# Patient Record
Sex: MALE | Race: WHITE | Employment: FULL TIME | URBAN - METROPOLITAN AREA
[De-identification: names, ages, dates, MRNs, and addresses within clinical notes are randomized per-mention and may not be internally consistent; named-entity substitution may affect disease eponyms.]

---

## 2018-05-16 ENCOUNTER — LAB ENCOUNTER (OUTPATIENT)
Dept: LAB | Facility: HOSPITAL | Age: 28
End: 2018-05-16
Attending: Other
Payer: COMMERCIAL

## 2018-05-16 DIAGNOSIS — G35 MULTIPLE SCLEROSIS (HCC): Primary | ICD-10-CM

## 2018-05-16 PROCEDURE — 82306 VITAMIN D 25 HYDROXY: CPT

## 2018-05-16 PROCEDURE — 83883 ASSAY NEPHELOMETRY NOT SPEC: CPT

## 2018-05-16 PROCEDURE — 86617 LYME DISEASE ANTIBODY: CPT

## 2018-05-16 PROCEDURE — 86256 FLUORESCENT ANTIBODY TITER: CPT

## 2018-05-16 PROCEDURE — 84155 ASSAY OF PROTEIN SERUM: CPT

## 2018-05-16 PROCEDURE — 82784 ASSAY IGA/IGD/IGG/IGM EACH: CPT

## 2018-05-16 PROCEDURE — 84165 PROTEIN E-PHORESIS SERUM: CPT

## 2018-05-16 PROCEDURE — 86038 ANTINUCLEAR ANTIBODIES: CPT

## 2018-05-16 PROCEDURE — 36415 COLL VENOUS BLD VENIPUNCTURE: CPT

## 2018-05-16 PROCEDURE — 86431 RHEUMATOID FACTOR QUANT: CPT

## 2018-05-16 PROCEDURE — 83516 IMMUNOASSAY NONANTIBODY: CPT

## 2018-05-16 PROCEDURE — 82525 ASSAY OF COPPER: CPT

## 2018-05-16 PROCEDURE — 86790 VIRUS ANTIBODY NOS: CPT

## 2018-05-16 PROCEDURE — 86334 IMMUNOFIX E-PHORESIS SERUM: CPT

## 2018-05-16 PROCEDURE — 86255 FLUORESCENT ANTIBODY SCREEN: CPT

## 2018-05-16 PROCEDURE — 82728 ASSAY OF FERRITIN: CPT

## 2018-05-16 PROCEDURE — 83921 ORGANIC ACID SINGLE QUANT: CPT

## 2018-05-17 NOTE — IMAGING NOTE
SPOKE WITH PATIENT AND REVIEWED MEDICATIONS AND PROCEDURAL PREP. WILL HAVE , TAKING XANAX PRIOR.  ARRIVE FOR [address] [place] [address]

## 2018-05-18 ENCOUNTER — APPOINTMENT (OUTPATIENT)
Dept: LAB | Facility: HOSPITAL | Age: 28
End: 2018-05-18
Attending: Other
Payer: COMMERCIAL

## 2018-05-18 ENCOUNTER — HOSPITAL ENCOUNTER (OUTPATIENT)
Dept: GENERAL RADIOLOGY | Facility: HOSPITAL | Age: 28
Discharge: HOME | End: 2018-05-18
Attending: Other
Payer: COMMERCIAL

## 2018-05-18 VITALS
OXYGEN SATURATION: 100 % | DIASTOLIC BLOOD PRESSURE: 74 MMHG | HEART RATE: 76 BPM | SYSTOLIC BLOOD PRESSURE: 126 MMHG | RESPIRATION RATE: 16 BRPM

## 2018-05-18 DIAGNOSIS — G35 MULTIPLE SCLEROSIS (HCC): ICD-10-CM

## 2018-05-18 PROCEDURE — 82042 OTHER SOURCE ALBUMIN QUAN EA: CPT

## 2018-05-18 PROCEDURE — 62270 DX LMBR SPI PNXR: CPT | Performed by: OTHER

## 2018-05-18 PROCEDURE — 84157 ASSAY OF PROTEIN OTHER: CPT

## 2018-05-18 PROCEDURE — 89050 BODY FLUID CELL COUNT: CPT

## 2018-05-18 PROCEDURE — 36415 COLL VENOUS BLD VENIPUNCTURE: CPT

## 2018-05-18 PROCEDURE — 82784 ASSAY IGA/IGD/IGG/IGM EACH: CPT

## 2018-05-18 PROCEDURE — 86592 SYPHILIS TEST NON-TREP QUAL: CPT

## 2018-05-18 PROCEDURE — 88160 CYTOPATH SMEAR OTHER SOURCE: CPT

## 2018-05-18 PROCEDURE — 82945 GLUCOSE OTHER FLUID: CPT

## 2018-05-18 PROCEDURE — 82040 ASSAY OF SERUM ALBUMIN: CPT

## 2018-05-18 PROCEDURE — 77003 FLUOROGUIDE FOR SPINE INJECT: CPT | Performed by: OTHER

## 2018-05-18 PROCEDURE — 89051 BODY FLUID CELL COUNT: CPT

## 2018-05-18 PROCEDURE — 83916 OLIGOCLONAL BANDS: CPT

## 2018-05-18 RX ORDER — LIDOCAINE HYDROCHLORIDE 10 MG/ML
INJECTION, SOLUTION EPIDURAL; INFILTRATION; INTRACAUDAL; PERINEURAL
Status: COMPLETED
Start: 2018-05-18 | End: 2018-05-18

## 2018-05-18 RX ORDER — LIDOCAINE HYDROCHLORIDE 10 MG/ML
10 INJECTION, SOLUTION EPIDURAL; INFILTRATION; INTRACAUDAL; PERINEURAL ONCE
Status: COMPLETED | OUTPATIENT
Start: 2018-05-18 | End: 2018-05-18

## 2018-05-18 RX ADMIN — LIDOCAINE HYDROCHLORIDE 7 ML: 10 INJECTION, SOLUTION EPIDURAL; INFILTRATION; INTRACAUDAL; PERINEURAL at 13:00:00

## 2018-05-18 NOTE — IMAGING NOTE
PT ASSESSED IN DI PRE LUMBAR PUNCTURE. REVIEWED MEDS AND ALLERGIES. VITAL SIGNS STABLE (SEE FLOW SHEET). PT STATES HE IS HAVING LUMBAR PUNCTURE DUE TO BILATERAL LOWER EXTREMITY WEAKNESS AND OCCASIONAL TINGLING SENSATION IN LOWER EXTREMITIES.  PT DENIES ANY

## 2019-08-16 ENCOUNTER — LAB ENCOUNTER (OUTPATIENT)
Dept: LAB | Age: 29
End: 2019-08-16
Attending: INTERNAL MEDICINE
Payer: COMMERCIAL

## 2019-08-16 DIAGNOSIS — Z00.00 ROUTINE GENERAL MEDICAL EXAMINATION AT A HEALTH CARE FACILITY: Primary | ICD-10-CM

## 2019-08-16 LAB
ALBUMIN SERPL-MCNC: 4.2 G/DL (ref 3.4–5)
ALBUMIN/GLOB SERPL: 1.2 {RATIO} (ref 1–2)
ALP LIVER SERPL-CCNC: 73 U/L (ref 45–117)
ALT SERPL-CCNC: 31 U/L (ref 16–61)
ANION GAP SERPL CALC-SCNC: 7 MMOL/L (ref 0–18)
AST SERPL-CCNC: 19 U/L (ref 15–37)
BASOPHILS # BLD AUTO: 0.06 X10(3) UL (ref 0–0.2)
BASOPHILS NFR BLD AUTO: 1.2 %
BILIRUB SERPL-MCNC: 0.7 MG/DL (ref 0.1–2)
BUN BLD-MCNC: 14 MG/DL (ref 7–18)
BUN/CREAT SERPL: 14.4 (ref 10–20)
CALCIUM BLD-MCNC: 8.9 MG/DL (ref 8.5–10.1)
CHLORIDE SERPL-SCNC: 106 MMOL/L (ref 98–112)
CHOLEST SMN-MCNC: 166 MG/DL (ref ?–200)
CO2 SERPL-SCNC: 27 MMOL/L (ref 21–32)
CREAT BLD-MCNC: 0.97 MG/DL (ref 0.7–1.3)
DEPRECATED RDW RBC AUTO: 39.8 FL (ref 35.1–46.3)
EOSINOPHIL # BLD AUTO: 0.23 X10(3) UL (ref 0–0.7)
EOSINOPHIL NFR BLD AUTO: 4.5 %
ERYTHROCYTE [DISTWIDTH] IN BLOOD BY AUTOMATED COUNT: 12.3 % (ref 11–15)
GLOBULIN PLAS-MCNC: 3.6 G/DL (ref 2.8–4.4)
GLUCOSE BLD-MCNC: 93 MG/DL (ref 70–99)
HCT VFR BLD AUTO: 41.2 % (ref 39–53)
HDLC SERPL-MCNC: 50 MG/DL (ref 40–59)
HGB BLD-MCNC: 14.1 G/DL (ref 13–17.5)
IMM GRANULOCYTES # BLD AUTO: 0 X10(3) UL (ref 0–1)
IMM GRANULOCYTES NFR BLD: 0 %
LDLC SERPL CALC-MCNC: 107 MG/DL (ref ?–100)
LYMPHOCYTES # BLD AUTO: 2.56 X10(3) UL (ref 1–4)
LYMPHOCYTES NFR BLD AUTO: 50.5 %
M PROTEIN MFR SERPL ELPH: 7.8 G/DL (ref 6.4–8.2)
MCH RBC QN AUTO: 30.3 PG (ref 26–34)
MCHC RBC AUTO-ENTMCNC: 34.2 G/DL (ref 31–37)
MCV RBC AUTO: 88.6 FL (ref 80–100)
MONOCYTES # BLD AUTO: 0.39 X10(3) UL (ref 0.1–1)
MONOCYTES NFR BLD AUTO: 7.7 %
NEUTROPHILS # BLD AUTO: 1.83 X10 (3) UL (ref 1.5–7.7)
NEUTROPHILS # BLD AUTO: 1.83 X10(3) UL (ref 1.5–7.7)
NEUTROPHILS NFR BLD AUTO: 36.1 %
NONHDLC SERPL-MCNC: 116 MG/DL (ref ?–130)
OSMOLALITY SERPL CALC.SUM OF ELEC: 290 MOSM/KG (ref 275–295)
PATIENT FASTING: YES
PATIENT FASTING: YES
PLATELET # BLD AUTO: 321 10(3)UL (ref 150–450)
POTASSIUM SERPL-SCNC: 3.9 MMOL/L (ref 3.5–5.1)
RBC # BLD AUTO: 4.65 X10(6)UL (ref 4.3–5.7)
SODIUM SERPL-SCNC: 140 MMOL/L (ref 136–145)
TRIGL SERPL-MCNC: 45 MG/DL (ref 30–149)
VLDLC SERPL CALC-MCNC: 9 MG/DL (ref 0–30)
WBC # BLD AUTO: 5.1 X10(3) UL (ref 4–11)

## 2019-08-16 PROCEDURE — 80061 LIPID PANEL: CPT

## 2019-08-16 PROCEDURE — 36415 COLL VENOUS BLD VENIPUNCTURE: CPT

## 2019-08-16 PROCEDURE — 80053 COMPREHEN METABOLIC PANEL: CPT

## 2019-08-16 PROCEDURE — 85025 COMPLETE CBC W/AUTO DIFF WBC: CPT

## 2024-02-08 ENCOUNTER — APPOINTMENT (OUTPATIENT)
Dept: CT IMAGING | Facility: HOSPITAL | Age: 34
End: 2024-02-08
Attending: EMERGENCY MEDICINE
Payer: COMMERCIAL

## 2024-02-08 ENCOUNTER — HOSPITAL ENCOUNTER (OUTPATIENT)
Facility: HOSPITAL | Age: 34
Setting detail: OBSERVATION
Discharge: HOME | End: 2024-02-10
Attending: EMERGENCY MEDICINE | Admitting: STUDENT IN AN ORGANIZED HEALTH CARE EDUCATION/TRAINING PROGRAM
Payer: COMMERCIAL

## 2024-02-08 DIAGNOSIS — K35.80 ACUTE APPENDICITIS, UNSPECIFIED ACUTE APPENDICITIS TYPE: Primary | ICD-10-CM

## 2024-02-08 DIAGNOSIS — K37 APPENDICITIS: ICD-10-CM

## 2024-02-08 DIAGNOSIS — K37 APPENDICITIS: Primary | ICD-10-CM

## 2024-02-08 LAB
ALBUMIN SERPL-MCNC: 4.3 G/DL (ref 3.4–5)
ALBUMIN/GLOB SERPL: 1.2 {RATIO} (ref 1–2)
ALP LIVER SERPL-CCNC: 72 U/L
ALT SERPL-CCNC: 31 U/L
ANION GAP SERPL CALC-SCNC: 5 MMOL/L (ref 0–18)
AST SERPL-CCNC: 23 U/L (ref 15–37)
BASOPHILS # BLD AUTO: 0.05 X10(3) UL (ref 0–0.2)
BASOPHILS NFR BLD AUTO: 0.4 %
BILIRUB SERPL-MCNC: 1 MG/DL (ref 0.1–2)
BILIRUB UR QL STRIP.AUTO: NEGATIVE
BUN BLD-MCNC: 14 MG/DL (ref 9–23)
CALCIUM BLD-MCNC: 9.4 MG/DL (ref 8.5–10.1)
CHLORIDE SERPL-SCNC: 107 MMOL/L (ref 98–112)
CLARITY UR REFRACT.AUTO: CLEAR
CO2 SERPL-SCNC: 25 MMOL/L (ref 21–32)
CREAT BLD-MCNC: 0.94 MG/DL
EGFRCR SERPLBLD CKD-EPI 2021: 108 ML/MIN/1.73M2 (ref 60–?)
EOSINOPHIL # BLD AUTO: 0.15 X10(3) UL (ref 0–0.7)
EOSINOPHIL NFR BLD AUTO: 1.2 %
ERYTHROCYTE [DISTWIDTH] IN BLOOD BY AUTOMATED COUNT: 12.2 %
GLOBULIN PLAS-MCNC: 3.7 G/DL (ref 2.8–4.4)
GLUCOSE BLD-MCNC: 107 MG/DL (ref 70–99)
GLUCOSE UR STRIP.AUTO-MCNC: NORMAL MG/DL
HCT VFR BLD AUTO: 41.3 %
HGB BLD-MCNC: 14.7 G/DL
IMM GRANULOCYTES # BLD AUTO: 0.05 X10(3) UL (ref 0–1)
IMM GRANULOCYTES NFR BLD: 0.4 %
KETONES UR STRIP.AUTO-MCNC: 10 MG/DL
LEUKOCYTE ESTERASE UR QL STRIP.AUTO: NEGATIVE
LIPASE SERPL-CCNC: 38 U/L (ref 13–75)
LYMPHOCYTES # BLD AUTO: 1.37 X10(3) UL (ref 1–4)
LYMPHOCYTES NFR BLD AUTO: 10.7 %
MCH RBC QN AUTO: 30.6 PG (ref 26–34)
MCHC RBC AUTO-ENTMCNC: 35.6 G/DL (ref 31–37)
MCV RBC AUTO: 86 FL
MONOCYTES # BLD AUTO: 0.73 X10(3) UL (ref 0.1–1)
MONOCYTES NFR BLD AUTO: 5.7 %
NEUTROPHILS # BLD AUTO: 10.4 X10 (3) UL (ref 1.5–7.7)
NEUTROPHILS # BLD AUTO: 10.4 X10(3) UL (ref 1.5–7.7)
NEUTROPHILS NFR BLD AUTO: 81.6 %
NITRITE UR QL STRIP.AUTO: NEGATIVE
OSMOLALITY SERPL CALC.SUM OF ELEC: 285 MOSM/KG (ref 275–295)
PH UR STRIP.AUTO: 6 [PH] (ref 5–8)
PLATELET # BLD AUTO: 318 10(3)UL (ref 150–450)
POTASSIUM SERPL-SCNC: 3.9 MMOL/L (ref 3.5–5.1)
PROT SERPL-MCNC: 8 G/DL (ref 6.4–8.2)
PROT UR STRIP.AUTO-MCNC: NEGATIVE MG/DL
RBC # BLD AUTO: 4.8 X10(6)UL
RBC UR QL AUTO: NEGATIVE
SODIUM SERPL-SCNC: 137 MMOL/L (ref 136–145)
SP GR UR STRIP.AUTO: 1.01 (ref 1–1.03)
UROBILINOGEN UR STRIP.AUTO-MCNC: NORMAL MG/DL
WBC # BLD AUTO: 12.8 X10(3) UL (ref 4–11)

## 2024-02-08 PROCEDURE — 96361 HYDRATE IV INFUSION ADD-ON: CPT

## 2024-02-08 PROCEDURE — 81003 URINALYSIS AUTO W/O SCOPE: CPT | Performed by: EMERGENCY MEDICINE

## 2024-02-08 PROCEDURE — 74177 CT ABD & PELVIS W/CONTRAST: CPT | Performed by: EMERGENCY MEDICINE

## 2024-02-08 PROCEDURE — 83690 ASSAY OF LIPASE: CPT | Performed by: EMERGENCY MEDICINE

## 2024-02-08 PROCEDURE — 99285 EMERGENCY DEPT VISIT HI MDM: CPT

## 2024-02-08 PROCEDURE — 96365 THER/PROPH/DIAG IV INF INIT: CPT

## 2024-02-08 PROCEDURE — 80053 COMPREHEN METABOLIC PANEL: CPT

## 2024-02-08 PROCEDURE — 80053 COMPREHEN METABOLIC PANEL: CPT | Performed by: EMERGENCY MEDICINE

## 2024-02-08 PROCEDURE — 85025 COMPLETE CBC W/AUTO DIFF WBC: CPT | Performed by: EMERGENCY MEDICINE

## 2024-02-08 PROCEDURE — 83690 ASSAY OF LIPASE: CPT

## 2024-02-08 PROCEDURE — 85025 COMPLETE CBC W/AUTO DIFF WBC: CPT

## 2024-02-08 PROCEDURE — 96376 TX/PRO/DX INJ SAME DRUG ADON: CPT

## 2024-02-08 PROCEDURE — 96375 TX/PRO/DX INJ NEW DRUG ADDON: CPT

## 2024-02-08 RX ORDER — KETOROLAC TROMETHAMINE 15 MG/ML
15 INJECTION, SOLUTION INTRAMUSCULAR; INTRAVENOUS ONCE
Status: COMPLETED | OUTPATIENT
Start: 2024-02-08 | End: 2024-02-08

## 2024-02-08 RX ORDER — ONDANSETRON 2 MG/ML
4 INJECTION INTRAMUSCULAR; INTRAVENOUS ONCE
Status: COMPLETED | OUTPATIENT
Start: 2024-02-08 | End: 2024-02-08

## 2024-02-08 NOTE — ED INITIAL ASSESSMENT (HPI)
Lower abdominal pain since morning with vomiting ,  no diarrhea , no fever , no blood in the vomitus

## 2024-02-09 ENCOUNTER — ANESTHESIA EVENT (OUTPATIENT)
Dept: SURGERY | Facility: HOSPITAL | Age: 34
End: 2024-02-09
Payer: COMMERCIAL

## 2024-02-09 ENCOUNTER — ANESTHESIA (OUTPATIENT)
Dept: SURGERY | Facility: HOSPITAL | Age: 34
End: 2024-02-09
Payer: COMMERCIAL

## 2024-02-09 VITALS
HEIGHT: 69 IN | SYSTOLIC BLOOD PRESSURE: 137 MMHG | OXYGEN SATURATION: 98 % | DIASTOLIC BLOOD PRESSURE: 86 MMHG | WEIGHT: 201.63 LBS | RESPIRATION RATE: 16 BRPM | HEART RATE: 58 BPM | BODY MASS INDEX: 29.86 KG/M2 | TEMPERATURE: 98 F

## 2024-02-09 PROCEDURE — 88312 SPECIAL STAINS GROUP 1: CPT | Performed by: STUDENT IN AN ORGANIZED HEALTH CARE EDUCATION/TRAINING PROGRAM

## 2024-02-09 PROCEDURE — 88304 TISSUE EXAM BY PATHOLOGIST: CPT | Performed by: STUDENT IN AN ORGANIZED HEALTH CARE EDUCATION/TRAINING PROGRAM

## 2024-02-09 PROCEDURE — 0DTJ4ZZ RESECTION OF APPENDIX, PERCUTANEOUS ENDOSCOPIC APPROACH: ICD-10-PCS | Performed by: STUDENT IN AN ORGANIZED HEALTH CARE EDUCATION/TRAINING PROGRAM

## 2024-02-09 RX ORDER — MIDAZOLAM HYDROCHLORIDE 1 MG/ML
INJECTION INTRAMUSCULAR; INTRAVENOUS AS NEEDED
Status: DISCONTINUED | OUTPATIENT
Start: 2024-02-09 | End: 2024-02-09 | Stop reason: SURG

## 2024-02-09 RX ORDER — SODIUM CHLORIDE 9 MG/ML
INJECTION, SOLUTION INTRAVENOUS CONTINUOUS
Status: DISCONTINUED | OUTPATIENT
Start: 2024-02-09 | End: 2024-02-10

## 2024-02-09 RX ORDER — NALOXONE HYDROCHLORIDE 0.4 MG/ML
0.08 INJECTION, SOLUTION INTRAMUSCULAR; INTRAVENOUS; SUBCUTANEOUS AS NEEDED
Status: DISCONTINUED | OUTPATIENT
Start: 2024-02-09 | End: 2024-02-09 | Stop reason: HOSPADM

## 2024-02-09 RX ORDER — HYDROCODONE BITARTRATE AND ACETAMINOPHEN 5; 325 MG/1; MG/1
2 TABLET ORAL ONCE AS NEEDED
Status: DISCONTINUED | OUTPATIENT
Start: 2024-02-09 | End: 2024-02-09 | Stop reason: HOSPADM

## 2024-02-09 RX ORDER — HYDROMORPHONE HYDROCHLORIDE 1 MG/ML
0.8 INJECTION, SOLUTION INTRAMUSCULAR; INTRAVENOUS; SUBCUTANEOUS EVERY 2 HOUR PRN
Status: DISCONTINUED | OUTPATIENT
Start: 2024-02-09 | End: 2024-02-10

## 2024-02-09 RX ORDER — ACETAMINOPHEN 500 MG
1000 TABLET ORAL ONCE AS NEEDED
Status: DISCONTINUED | OUTPATIENT
Start: 2024-02-09 | End: 2024-02-09 | Stop reason: HOSPADM

## 2024-02-09 RX ORDER — BUPIVACAINE HYDROCHLORIDE 2.5 MG/ML
INJECTION, SOLUTION EPIDURAL; INFILTRATION; INTRACAUDAL AS NEEDED
Status: DISCONTINUED | OUTPATIENT
Start: 2024-02-09 | End: 2024-02-09 | Stop reason: HOSPADM

## 2024-02-09 RX ORDER — SODIUM CHLORIDE, SODIUM LACTATE, POTASSIUM CHLORIDE, CALCIUM CHLORIDE 600; 310; 30; 20 MG/100ML; MG/100ML; MG/100ML; MG/100ML
INJECTION, SOLUTION INTRAVENOUS CONTINUOUS
Status: DISCONTINUED | OUTPATIENT
Start: 2024-02-09 | End: 2024-02-10

## 2024-02-09 RX ORDER — CEFAZOLIN SODIUM 1 G/3ML
INJECTION, POWDER, FOR SOLUTION INTRAMUSCULAR; INTRAVENOUS AS NEEDED
Status: DISCONTINUED | OUTPATIENT
Start: 2024-02-09 | End: 2024-02-09 | Stop reason: SURG

## 2024-02-09 RX ORDER — GLYCOPYRROLATE 0.2 MG/ML
INJECTION, SOLUTION INTRAMUSCULAR; INTRAVENOUS AS NEEDED
Status: DISCONTINUED | OUTPATIENT
Start: 2024-02-09 | End: 2024-02-09 | Stop reason: SURG

## 2024-02-09 RX ORDER — ACETAMINOPHEN 325 MG/1
650 TABLET ORAL
Status: DISCONTINUED | OUTPATIENT
Start: 2024-02-09 | End: 2024-02-10

## 2024-02-09 RX ORDER — NEOSTIGMINE METHYLSULFATE 1 MG/ML
INJECTION, SOLUTION INTRAVENOUS AS NEEDED
Status: DISCONTINUED | OUTPATIENT
Start: 2024-02-09 | End: 2024-02-09 | Stop reason: SURG

## 2024-02-09 RX ORDER — PROCHLORPERAZINE EDISYLATE 5 MG/ML
5 INJECTION INTRAMUSCULAR; INTRAVENOUS EVERY 8 HOURS PRN
Status: DISCONTINUED | OUTPATIENT
Start: 2024-02-09 | End: 2024-02-10

## 2024-02-09 RX ORDER — ROCURONIUM BROMIDE 10 MG/ML
INJECTION, SOLUTION INTRAVENOUS AS NEEDED
Status: DISCONTINUED | OUTPATIENT
Start: 2024-02-09 | End: 2024-02-09 | Stop reason: SURG

## 2024-02-09 RX ORDER — ONDANSETRON 2 MG/ML
4 INJECTION INTRAMUSCULAR; INTRAVENOUS EVERY 6 HOURS PRN
Status: DISCONTINUED | OUTPATIENT
Start: 2024-02-09 | End: 2024-02-10

## 2024-02-09 RX ORDER — OXYCODONE HYDROCHLORIDE 10 MG/1
5 TABLET ORAL EVERY 4 HOURS PRN
Status: DISCONTINUED | OUTPATIENT
Start: 2024-02-09 | End: 2024-02-10

## 2024-02-09 RX ORDER — ENOXAPARIN SODIUM 100 MG/ML
40 INJECTION SUBCUTANEOUS DAILY
Status: DISCONTINUED | OUTPATIENT
Start: 2024-02-09 | End: 2024-02-10

## 2024-02-09 RX ORDER — HYDROMORPHONE HYDROCHLORIDE 1 MG/ML
0.4 INJECTION, SOLUTION INTRAMUSCULAR; INTRAVENOUS; SUBCUTANEOUS EVERY 5 MIN PRN
Status: DISCONTINUED | OUTPATIENT
Start: 2024-02-09 | End: 2024-02-09 | Stop reason: HOSPADM

## 2024-02-09 RX ORDER — KETOROLAC TROMETHAMINE 30 MG/ML
INJECTION, SOLUTION INTRAMUSCULAR; INTRAVENOUS AS NEEDED
Status: DISCONTINUED | OUTPATIENT
Start: 2024-02-09 | End: 2024-02-09 | Stop reason: SURG

## 2024-02-09 RX ORDER — HYDROMORPHONE HYDROCHLORIDE 1 MG/ML
0.4 INJECTION, SOLUTION INTRAMUSCULAR; INTRAVENOUS; SUBCUTANEOUS EVERY 2 HOUR PRN
Status: DISCONTINUED | OUTPATIENT
Start: 2024-02-09 | End: 2024-02-10

## 2024-02-09 RX ORDER — HYDROMORPHONE HYDROCHLORIDE 1 MG/ML
0.2 INJECTION, SOLUTION INTRAMUSCULAR; INTRAVENOUS; SUBCUTANEOUS EVERY 2 HOUR PRN
Status: DISCONTINUED | OUTPATIENT
Start: 2024-02-09 | End: 2024-02-10

## 2024-02-09 RX ORDER — SODIUM CHLORIDE, SODIUM LACTATE, POTASSIUM CHLORIDE, CALCIUM CHLORIDE 600; 310; 30; 20 MG/100ML; MG/100ML; MG/100ML; MG/100ML
INJECTION, SOLUTION INTRAVENOUS CONTINUOUS PRN
Status: DISCONTINUED | OUTPATIENT
Start: 2024-02-09 | End: 2024-02-09 | Stop reason: SURG

## 2024-02-09 RX ORDER — DEXAMETHASONE SODIUM PHOSPHATE 4 MG/ML
VIAL (ML) INJECTION AS NEEDED
Status: DISCONTINUED | OUTPATIENT
Start: 2024-02-09 | End: 2024-02-09 | Stop reason: SURG

## 2024-02-09 RX ORDER — OXYCODONE HYDROCHLORIDE 10 MG/1
10 TABLET ORAL EVERY 4 HOURS PRN
Status: DISCONTINUED | OUTPATIENT
Start: 2024-02-09 | End: 2024-02-10

## 2024-02-09 RX ORDER — HYDROMORPHONE HYDROCHLORIDE 1 MG/ML
0.2 INJECTION, SOLUTION INTRAMUSCULAR; INTRAVENOUS; SUBCUTANEOUS EVERY 5 MIN PRN
Status: DISCONTINUED | OUTPATIENT
Start: 2024-02-09 | End: 2024-02-09 | Stop reason: HOSPADM

## 2024-02-09 RX ORDER — ONDANSETRON 2 MG/ML
4 INJECTION INTRAMUSCULAR; INTRAVENOUS EVERY 6 HOURS PRN
Status: DISCONTINUED | OUTPATIENT
Start: 2024-02-09 | End: 2024-02-09 | Stop reason: HOSPADM

## 2024-02-09 RX ORDER — HEPARIN SODIUM 5000 [USP'U]/ML
5000 INJECTION, SOLUTION INTRAVENOUS; SUBCUTANEOUS EVERY 8 HOURS SCHEDULED
Status: DISCONTINUED | OUTPATIENT
Start: 2024-02-10 | End: 2024-02-10

## 2024-02-09 RX ORDER — PROCHLORPERAZINE EDISYLATE 5 MG/ML
5 INJECTION INTRAMUSCULAR; INTRAVENOUS EVERY 8 HOURS PRN
Status: DISCONTINUED | OUTPATIENT
Start: 2024-02-09 | End: 2024-02-09 | Stop reason: HOSPADM

## 2024-02-09 RX ORDER — SODIUM CHLORIDE, SODIUM LACTATE, POTASSIUM CHLORIDE, CALCIUM CHLORIDE 600; 310; 30; 20 MG/100ML; MG/100ML; MG/100ML; MG/100ML
INJECTION, SOLUTION INTRAVENOUS CONTINUOUS
Status: DISCONTINUED | OUTPATIENT
Start: 2024-02-09 | End: 2024-02-09 | Stop reason: HOSPADM

## 2024-02-09 RX ORDER — HYDROMORPHONE HYDROCHLORIDE 1 MG/ML
INJECTION, SOLUTION INTRAMUSCULAR; INTRAVENOUS; SUBCUTANEOUS
Status: COMPLETED
Start: 2024-02-09 | End: 2024-02-09

## 2024-02-09 RX ORDER — LIDOCAINE HYDROCHLORIDE 10 MG/ML
INJECTION, SOLUTION EPIDURAL; INFILTRATION; INTRACAUDAL; PERINEURAL AS NEEDED
Status: DISCONTINUED | OUTPATIENT
Start: 2024-02-09 | End: 2024-02-09 | Stop reason: SURG

## 2024-02-09 RX ORDER — NALOXONE HYDROCHLORIDE 4 MG/.1ML
4 SPRAY NASAL AS NEEDED
Qty: 1 KIT | Refills: 0 | Status: SHIPPED | OUTPATIENT
Start: 2024-02-09

## 2024-02-09 RX ORDER — HYDROMORPHONE HYDROCHLORIDE 1 MG/ML
0.6 INJECTION, SOLUTION INTRAMUSCULAR; INTRAVENOUS; SUBCUTANEOUS EVERY 5 MIN PRN
Status: DISCONTINUED | OUTPATIENT
Start: 2024-02-09 | End: 2024-02-09 | Stop reason: HOSPADM

## 2024-02-09 RX ORDER — METRONIDAZOLE 500 MG/100ML
INJECTION, SOLUTION INTRAVENOUS AS NEEDED
Status: DISCONTINUED | OUTPATIENT
Start: 2024-02-09 | End: 2024-02-09 | Stop reason: SURG

## 2024-02-09 RX ORDER — OXYCODONE HYDROCHLORIDE 5 MG/1
5 TABLET ORAL EVERY 4 HOURS PRN
Qty: 15 TABLET | Refills: 0 | Status: SHIPPED | OUTPATIENT
Start: 2024-02-09

## 2024-02-09 RX ORDER — ONDANSETRON 2 MG/ML
INJECTION INTRAMUSCULAR; INTRAVENOUS AS NEEDED
Status: DISCONTINUED | OUTPATIENT
Start: 2024-02-09 | End: 2024-02-09 | Stop reason: SURG

## 2024-02-09 RX ORDER — HYDROCODONE BITARTRATE AND ACETAMINOPHEN 5; 325 MG/1; MG/1
1 TABLET ORAL ONCE AS NEEDED
Status: DISCONTINUED | OUTPATIENT
Start: 2024-02-09 | End: 2024-02-09 | Stop reason: HOSPADM

## 2024-02-09 RX ADMIN — ROCURONIUM BROMIDE 50 MG: 10 INJECTION, SOLUTION INTRAVENOUS at 16:57:00

## 2024-02-09 RX ADMIN — SODIUM CHLORIDE, SODIUM LACTATE, POTASSIUM CHLORIDE, CALCIUM CHLORIDE: 600; 310; 30; 20 INJECTION, SOLUTION INTRAVENOUS at 16:51:00

## 2024-02-09 RX ADMIN — CEFAZOLIN SODIUM 2 G: 1 INJECTION, POWDER, FOR SOLUTION INTRAMUSCULAR; INTRAVENOUS at 17:07:00

## 2024-02-09 RX ADMIN — DEXAMETHASONE SODIUM PHOSPHATE 4 MG: 4 MG/ML VIAL (ML) INJECTION at 17:07:00

## 2024-02-09 RX ADMIN — LIDOCAINE HYDROCHLORIDE 50 MG: 10 INJECTION, SOLUTION EPIDURAL; INFILTRATION; INTRACAUDAL; PERINEURAL at 16:57:00

## 2024-02-09 RX ADMIN — METRONIDAZOLE 500 MG: 500 INJECTION, SOLUTION INTRAVENOUS at 17:10:00

## 2024-02-09 RX ADMIN — NEOSTIGMINE METHYLSULFATE 3 MG: 1 INJECTION, SOLUTION INTRAVENOUS at 17:40:00

## 2024-02-09 RX ADMIN — KETOROLAC TROMETHAMINE 30 MG: 30 INJECTION, SOLUTION INTRAMUSCULAR; INTRAVENOUS at 17:40:00

## 2024-02-09 RX ADMIN — ONDANSETRON 4 MG: 2 INJECTION INTRAMUSCULAR; INTRAVENOUS at 17:40:00

## 2024-02-09 RX ADMIN — MIDAZOLAM HYDROCHLORIDE 2 MG: 1 INJECTION INTRAMUSCULAR; INTRAVENOUS at 16:54:00

## 2024-02-09 RX ADMIN — GLYCOPYRROLATE 0.4 MG: 0.2 INJECTION, SOLUTION INTRAMUSCULAR; INTRAVENOUS at 17:40:00

## 2024-02-09 NOTE — PLAN OF CARE
Patient is alert and oriented. On room air. Abdomen is soft/ non-distended. Patient denies nausea. Patient states last BM yesterday. Patient denies nausea. Patient c/o 6/10 abdominal pain. Dilaudid given. Voiding freely. Receiving IVF and IV Abx. Patient is NPO for surgery later this afternoon. POC updated with patient. Patient verbalized understanding.      1627: Patient left unit to the OR via bed.

## 2024-02-09 NOTE — ED PROVIDER NOTES
Patient Seen in: [facility] 3nw-a      History     Chief Complaint   Patient presents with    Abdomen/Flank Pain     Stated Complaint: abdominal pain    Subjective:   HPI    33 yr old M presents to ED with complaint of right lower quadrant abdominal pain since this morning.  He states he had an episode of vomiting this morning and has not been able to keep anything down.  He denies prior abdominal surgeries.  He denies fever, diarrhea.    Objective:   History reviewed. No pertinent past medical history.           History reviewed. No pertinent surgical history.             Social History     Socioeconomic History    Marital status:    Tobacco Use    Smoking status: Never    Smokeless tobacco: Never   Vaping Use    Vaping Use: Never used   Substance and Sexual Activity    Alcohol use: Yes     Comment: socially    Drug use: Never     Social Determinants of Health     Food Insecurity: No Food Insecurity (2/9/2024)    Food Insecurity     Food Insecurity: Never true   Transportation Needs: No Transportation Needs (2/9/2024)    Transportation Needs     Lack of Transportation: No   Housing Stability: Low Risk  (2/9/2024)    Housing Stability     Housing Instability: No              Review of Systems    Positive for stated complaint: abdominal pain  Other systems are as noted in HPI.  Constitutional and vital signs reviewed.      All other systems reviewed and negative except as noted above.    Physical Exam     ED Triage Vitals [02/08/24 1456]   /86   Pulse 70   Resp 18   Temp 97.6 °F (36.4 °C)   Temp src Temporal   SpO2 98 %   O2 Device None (Room air)       Current:/69 (BP Location: Left arm)   Pulse 65   Temp 98.5 °F (36.9 °C) (Oral)   Resp 18   Ht 175.3 cm (5' 9\")   Wt 91.4 kg   SpO2 97%   BMI 29.77 kg/m²         Physical Exam    Vital signs reviewed.  Nursing note reviewed.  Constitutional: Alert, well-appearing  Head: Normocephalic, atraumatic  Mouth: Moist  Eyes: Extraocular muscles  intact, pupils equal  Cardiovascular: Regular rate and rhythm  Pulmonary: Effort normal, breath sounds normal  Abdomen: Soft, right lower quadrant abdominal tenderness, nondistended  Skin: Warm and dry  Musculoskeletal range of motion grossly normal all extremities  Neuro: Alert, at baseline, no focal neuro deficit.  Moves all extremities against gravity  Psych: Mood normal          ED Course     Labs Reviewed   COMP METABOLIC PANEL (14) - Abnormal; Notable for the following components:       Result Value    Glucose 107 (*)     All other components within normal limits   URINALYSIS, ROUTINE - Abnormal; Notable for the following components:    Ketones Urine 10 (*)     All other components within normal limits   CBC W/ DIFFERENTIAL - Abnormal; Notable for the following components:    WBC 12.8 (*)     Neutrophil Absolute Prelim 10.40 (*)     Neutrophil Absolute 10.40 (*)     All other components within normal limits   LIPASE - Normal   CBC WITH DIFFERENTIAL WITH PLATELET    Narrative:     The following orders were created for panel order CBC With Differential With Platelet.  Procedure                               Abnormality         Status                     ---------                               -----------         ------                     CBC W/ DIFFERENTIAL[[id number]]          Abnormal            Final result                 Please view results for these tests on the individual orders.             CT ABDOMEN+PELVIS(CONTRAST ONLY)(CPT=74177)    Result Date: 2/8/2024  PROCEDURE:  CT ABDOMEN+PELVIS (CONTRAST ONLY) (CPT=74177)  COMPARISON:  None.  INDICATIONS:  abdominal pain  TECHNIQUE:  CT scanning was performed from the dome of the diaphragm to the pubic symphysis with non-ionic intravenous contrast material. Post contrast coronal MPR imaging was performed.  Dose reduction techniques were used. Dose information is transmitted to the ACR (American College of Radiology) NRDR (National Radiology Data Registry)  which includes the Dose Index Registry.  PATIENT STATED HISTORY:(As transcribed by Technologist)  Lower abdominal pain since morning with vomiting ,  no diarrhea , no fever   CONTRAST USED:  100cc of Isovue 370  FINDINGS:  LIVER:  No enlargement, atrophy, abnormal density, or significant focal lesion.  BILIARY:  No visible dilatation or calcification.  PANCREAS:  No lesion, fluid collection, ductal dilatation, or atrophy.  SPLEEN:  No enlargement or focal lesion.  KIDNEYS:  No mass, obstruction, or calcification.  ADRENALS:  No mass or enlargement.  AORTA/VASCULAR:  No aneurysm or dissection.  RETROPERITONEUM:  No mass or adenopathy.  BOWEL/MESENTERY:  No dilated loops of small bowel are seen.  Portions of the bowel are decompressed, limiting evaluation.  The appendix is enlarged measuring up to 12 mm with mild local inflammatory changes.  This most likely represents acute appendicitis.  There is no evidence of an abscess or drainable fluid collection.  Trace free pelvic fluid is present ABDOMINAL WALL:  No mass or hernia.  URINARY BLADDER:  Urinary bladder is decompressed which limits assessment PELVIC NODES:  No adenopathy.  PELVIC ORGANS:  No visible mass.  Pelvic organs appropriate for patient age.  BONES:  No bony lesion or fracture.  LUNG BASES:  No visible pulmonary or pleural disease.  OTHER:  Negative.             CONCLUSION:  Dilated appendix with local inflammatory changes most compatible with acute appendicitis.  There is no evidence of an abscess or drainable fluid collection at this time.  Critical results were discussed with Dr. Morales by Dr. Sanders at approximately 1958 on 2/8/24.  Read back was performed.    LOCATION:  [place]   Dictated by (CST): Oscar Sanders MD on 2/08/2024 at 7:56 PM     Finalized by (CST): Oscar Sanders MD on 2/08/2024 at 7:59 PM               [facility]      Medical Decision Making:    Differential diagnosis before testing includes appendicitis, small bowel obstruction potential life  threatening diagnosis which is a medical condition that poses a threat to life/function.     Discussions of management with: surgery, Dr. Benito, agrees with Cefoxitin, and states he will take him to OR in AM       I personally reviewed the CT abdomen and my independent interpretation includes acute appendicitis    Shared decision making:  Admission disposition: 2/8/2024  8:13 PM         33-year-old male here with right lower quadrant abdominal pain found to have acute appendicitis.  Leukocytosis 12.8, electrolytes within normal limits.  Discussed with surgery as above, states he will take him to the OR in the morning.  Given IV cefoxitin.  Patient given IV Toradol, IV Zofran x 2, IV fluids.  Patient aware of and agrees with plan.                               MDM    Disposition and Plan     Clinical Impression:  1. Acute appendicitis, unspecified acute appendicitis type         Disposition:  Admit  2/8/2024  8:13 pm    Follow-up:  No follow-up provider specified.        Medications Prescribed:  Current Discharge Medication List                            Hospital Problems       Present on Admission             ICD-10-CM Noted POA    * (Principal) Acute appendicitis, unspecified acute appendicitis type K35.80 2/8/2024 Unknown

## 2024-02-09 NOTE — PROGRESS NOTES
A&Ox4. VSS. RA.  GI: Abdomen soft, nondistended.  Denies nausea.  : Voids.  Pain controlled with PRN pain medications  Up with standby assist.  Diet: NPO  IVF running per order.  All appropriate safety measures in place. Patient updated on plan of care. All questions and concerns addressed.

## 2024-02-09 NOTE — ED QUICK NOTES
Orders for admission, patient is aware of plan and ready to go upstairs. Any questions, please call ED RN Rocío at extension [phone].     Patient Covid vaccination status: Unvaccinated     COVID Test Ordered in ED: None    COVID Suspicion at Admission: N/A    Running Infusions:      Mental Status/LOC at time of transport: Alert, oriented X4.    Other pertinent information: NPO all day. Plan is for surgery in AM.   CIWA score: N/A   NIH score:  N/A

## 2024-02-09 NOTE — ED QUICK NOTES
Patient states pain is much better. States he's not eaten or drank anything all day. NPO all day. Is upset to learn he has appendicitis.

## 2024-02-09 NOTE — ANESTHESIA PROCEDURE NOTES
Airway  Date/Time: 2/9/2024 5:00 PM  Urgency: elective    Airway not difficult    General Information and Staff    Patient location during procedure: OR  Anesthesiologist: Shanice Edwards MD  Performed: anesthesiologist   Performed by: Shanice Edwards MD  Authorized by: Shanice Edwards MD      Indications and Patient Condition  Indications for airway management: anesthesia  Spontaneous Ventilation: absent  Sedation level: deep  Preoxygenated: yes  Patient position: sniffing  Mask difficulty assessment: 1 - vent by mask    Final Airway Details  Final airway type: endotracheal airway      Successful airway: ETT  Cuffed: yes   Successful intubation technique: direct laryngoscopy  Facilitating devices/methods: intubating stylet  Endotracheal tube insertion site: oral  Blade: Ruben  Blade size: #3  ETT size (mm): 7.5    Cormack-Lehane Classification: grade IIA - partial view of glottis  Placement verified by: capnometry   Measured from: lips  ETT to lips (cm): 23  Number of attempts at approach: 1  Number of other approaches attempted: 0    Additional Comments  Smooth induction. Eyes taped after induction, prior to intubation.  Uncomplicated, successful intubation. Dentition same as previous, atraumatic.

## 2024-02-09 NOTE — H&P
[facility]  History & Physical    Eliazar Ingram Patient Status:  Observation    1990 MRN [id number]   Location [facility] 3NW-A Attending Robb Benito MD   Hosp Day # 0 PCP Michael Brunner     Reason for Admission:  Acute appendicitis     History of Present Illness:  Eliazar Ingram is a(n) 33 year old male who presented to [facility] on 2024 with concerns of lower abdominal pain that started morning of presentation. He reports pain was constant and progressively worsened throughout the day. He reports associated nausea and emesis after oral intake. He denies fever but reports chills. He denies diarrhea or constipation. He denies episode of similar pain in the past.     Upon presentation to the emergency department he was afebrile and hemodynamically stable. Laboratory workup revealed leukocytosis, WBC 12.8, hemoglobin 14.7, platelets 318,000. CMP revealed no gross electrolyte abnormalities. No acute kidney injury. No elevation in LFTs. Lipase within normal limits.    He reports no significant past medical history. He reports no past abdominal surgical history. He is not on any blood thinning medications.     History:  History reviewed. No pertinent past medical history.  History reviewed. No pertinent surgical history.  No family history on file.   reports that he has never smoked. He has never used smokeless tobacco. He reports current alcohol use. He reports that he does not use drugs.    Allergies:  No Known Allergies    Home Medications:  Medications Prior to Admission   Medication Sig Dispense Refill Last Dose    Amphetamine-Dextroamphet ER (ADDERALL XR) 20 MG Oral Capsule SR 24 Hr Take 1 capsule (20 mg total) by mouth every morning.   2024       Review of Systems:    Allergic/Immuno:  Patient has no known allergies.  Cardiovascular:  Negative for cool extremity and irregular heartbeat/palpitations  Constitutional:  Negative for decreased activity, fever, irritability  and lethargy  Endocrine:  Negative for abnormal sleep patterns, increased activity, polydipsia and polyphagia  ENMT:  Negative for ear drainage, hearing loss and nasal drainage  Eyes:  Negative for eye discharge and vision loss  Gastrointestinal:  Positive for abdominal pain, decreased oral intake, nausea and vomiting  Genitourinary:  Negative for dysuria and hematuria  Hema/Lymph:  Negative for easy bleeding and easy bruising  Integumentary:  Negative for pruritus and rash  Musculoskeletal:  Negative for bone/joint symptoms  Neurological:  Negative for gait disturbance  Psychiatric:  Negative for inappropriate interaction and psychiatric symptoms  Respiratory:  Negative for cough, dyspnea and wheezing    Physical Exam:   General: Awake, Alert,   Cooperative.  No apparent distress.  Vital Signs:  Blood pressure 137/69, pulse 59, temperature 98.4 °F (36.9 °C), temperature source Oral, resp. rate 18, height 69\", weight 201 lb 9.6 oz, SpO2 97%.  HEENT: Exam is unremarkable.  Without scleral icterus.  Mucous membranes are moist. Pupils are equal and round, reactive to light and accommodate.  Pupils are approximately 3mm and react to 2mm with reaction to light.  Oropharynx is clear.  Neck:  Full range of motion to flexion and extension, lateral rotation and lateral flexion of cervical spine.  No JVD. Supple.   Lungs: No respiratory distress, effort normal.   Abdomen:  Soft, non-distended, mildly tender in right lower quadrant, with no rebound or guarding.  No peritoneal signs. No ascites.  Liver is within normal limits.  Spleen is not palpable.    Extremities:  No lower extremity edema noted.  Without clubbing or cyanosis.    Skin: Normal texture and turgor.  Lymphatic:  No palpable cervical lymphadenopathy.  Neurologic: Cranial nerves are grossly intact.  Motor strength and sensory examination is grossly normal.  No focal neurologic deficit.      Marie Salmon PA-C  2/9/2024  8:25 AM    Medical decision  Making  Laboratory Data:  Lab Results   Component Value Date    WBC 12.8 02/08/2024    HGB 14.7 02/08/2024    HCT 41.3 02/08/2024    .0 02/08/2024     No results found for: \"PT\", \"INR\"  Lab Results   Component Value Date     02/08/2024    K 3.9 02/08/2024     02/08/2024    CO2 25.0 02/08/2024    BUN 14 02/08/2024    CREATSERUM 0.94 02/08/2024     02/08/2024    CA 9.4 02/08/2024    ALKPHO 72 02/08/2024    ALT 31 02/08/2024    AST 23 02/08/2024    BILT 1.0 02/08/2024    ALB 4.3 02/08/2024    TP 8.0 02/08/2024         Impression and Plan:  Patient Active Problem List   Diagnosis    Acute appendicitis, unspecified acute appendicitis type     33 year old male who presents with right lower quadrant pain, nausea and vomiting. He is tender in the right lower quadrant. He has leukocytosis. I reviewed the CT scan of his abdomen and pelvis and provided my interpretation. There is a dilated appendix with inflammatory changes in the right lower quadrant. There are no signs of a walled off abscess.   The risks, benefits, and alternatives of surgical intervention were explained to the patient and the family in detail, including but not limited to bleeding, infection, nondiagnostic yield, incorrect diagnosis, injury to adjacent organs and structures, postoperative infection and/or abscess, conversion to open procedure, and the need for further therapeutic, diagnostic, or surgical intervention.        Is this a shared or split note between Advanced Practice Provider and Physician? Yes

## 2024-02-09 NOTE — ANESTHESIA PREPROCEDURE EVALUATION
PRE-OP EVALUATION    Patient Name: Eliazar Ingram    Admit Diagnosis: Acute appendicitis, unspecified acute appendicitis type [K35.80]    Pre-op Diagnosis: Appendicitis [K37]    LAPAROSCOPIC APPENDECTOMY    Anesthesia Procedure: LAPAROSCOPIC APPENDECTOMY (Abdomen)    Surgeon(s) and Role:     * Robb Benito MD - Primary    Pre-op vitals reviewed.  Temp: 98.3 °F (36.8 °C)  Pulse: 49  Resp: 18  BP: 131/77  SpO2: 97 %  Body mass index is 29.77 kg/m².    Current medications reviewed.  Hospital Medications:   [MAR Hold] HYDROmorphone (Dilaudid) 1 MG/ML injection 0.2 mg  0.2 mg Intravenous Q2H PRN    Or    [MAR Hold] HYDROmorphone (Dilaudid) 1 MG/ML injection 0.4 mg  0.4 mg Intravenous Q2H PRN    Or    [MAR Hold] HYDROmorphone (Dilaudid) 1 MG/ML injection 0.8 mg  0.8 mg Intravenous Q2H PRN    sodium chloride 0.9% infusion   Intravenous Continuous    piperacillin-tazobactam (Zosyn) 3.375 g in dextrose 5% 100 mL IVPB-ADDV  3.375 g Intravenous Q8H    [MAR Hold] enoxaparin (Lovenox) 40 MG/0.4ML SUBQ injection 40 mg  40 mg Subcutaneous Daily    [MAR Hold] ondansetron (Zofran) 4 MG/2ML injection 4 mg  4 mg Intravenous Q6H PRN    [COMPLETED] ondansetron (Zofran) 4 MG/2ML injection 4 mg  4 mg Intravenous Once    [COMPLETED] iopamidol 76% (ISOVUE-370) injection for power injector  100 mL Intravenous ONCE PRN    [COMPLETED] cefOXitin Sodium (Mefoxin) 1 g in sodium chloride 0.9% 100 mL IVPB-MBP  1 g Intravenous Once    [COMPLETED] sodium chloride 0.9 % IV bolus 1,000 mL  1,000 mL Intravenous Once    [COMPLETED] ketorolac (Toradol) 15 MG/ML injection 15 mg  15 mg Intravenous Once    [COMPLETED] ondansetron (Zofran) 4 MG/2ML injection 4 mg  4 mg Intravenous Once       Outpatient Medications:     Medications Prior to Admission   Medication Sig Dispense Refill Last Dose    Amphetamine-Dextroamphet ER (ADDERALL XR) 20 MG Oral Capsule SR 24 Hr Take 1 capsule (20 mg total) by mouth every morning.   2/8/2024       Allergies: Patient  has no known allergies.      Anesthesia Evaluation    Patient summary reviewed.    Anesthetic Complications  (-) history of anesthetic complications         GI/Hepatic/Renal  Comment: Acute appendicitis                               Cardiovascular    Negative cardiovascular ROS.  ECG reviewed.  Exercise tolerance: good     MET: >4                             (-) angina     (-) RODRIGUEZ         Endo/Other    Negative endo/other ROS.                              Pulmonary    Negative pulmonary ROS.           (-) shortness of breath            Neuro/Psych    Negative neuro/psych ROS.                                  History reviewed. No pertinent surgical history.  Social History     Socioeconomic History    Marital status:    Tobacco Use    Smoking status: Never    Smokeless tobacco: Never   Vaping Use    Vaping Use: Never used   Substance and Sexual Activity    Alcohol use: Yes     Comment: socially    Drug use: Never     History   Drug Use Unknown     Available pre-op labs reviewed.  Lab Results   Component Value Date    WBC 12.8 (H) 02/08/2024    RBC 4.80 02/08/2024    HGB 14.7 02/08/2024    HCT 41.3 02/08/2024    MCV 86.0 02/08/2024    MCH 30.6 02/08/2024    MCHC 35.6 02/08/2024    RDW 12.2 02/08/2024    .0 02/08/2024     Lab Results   Component Value Date     02/08/2024    K 3.9 02/08/2024     02/08/2024    CO2 25.0 02/08/2024    BUN 14 02/08/2024    CREATSERUM 0.94 02/08/2024     (H) 02/08/2024    CA 9.4 02/08/2024            Airway      Mallampati: II  Mouth opening: >3 FB  TM distance: 4 - 6 cm  Neck ROM: full Cardiovascular    Cardiovascular exam normal.         Dental  Comment: Denies loose, chipped, cracked teeth, Dentition grossly intact on exam             Pulmonary    Pulmonary exam normal.                 Other findings              ASA: 2   Plan: general  NPO status verified and patient meets guidelines.    Post-procedure pain management plan discussed with surgeon and  patient.    Comment: Spoke with pt and discussed risks of GA including, nausea, vomiting, injury to lips, gums, tongue, teeth, eyes, awareness under anesthesia, cardiac, pulmonary, aspiration, and stroke events, delayed emergence, allergic reaction, and death. Pt understands and consents to receiving anesthesia    Plan/risks discussed with: patient                Present on Admission:  **None**

## 2024-02-09 NOTE — DISCHARGE INSTRUCTIONS
Home Care Instructions  Laparoscopic Appendectomy  Robb Benito MD      WHAT TO EXPECT  You may feel pain at the incisions. This is due to stitches placed during the surgery.    You may feel pain in the shoulders. This is due to irritation of the diaphragm by the air used to inflate the abdomen.    You may feel a sore throat. This is due to the breathing tube used during surgery.     You may feel mild nausea and vomiting for the first 24 hours, this should resolve quickly.    You may have constipation, especially if taking narcotic pain medications. If you have not had a bowel movement by 48 hours after surgery, take Miralax 17g (one cap full) every 12 hours until you have a bowel movement. If another 24 hours goes by without a bowel movement, then take a dose of magnesium citrate or milk of magnesia.     MEDICATIONS  Take 2 Extra Strength Tylenol (1000mg every) 8 hours for pain. For the first 3 days it is best to take the Tylenol every 8 hours even if you do not feel much pain.     For moderate to severe pain take one Oxycodone pill (5mg) every six hours as needed for pain. If you do not feel that narcotics are necessary you shouldn’t take them. If the pain is severe you can take two pills (10mg) every six hours.    You can also take Advil (ibuprofen) 800mg every 8 hours or Alleve (naproxen) 500mg every 12 hours for pain.     Please ask your surgeon before resuming blood thinners such as Aspirin, Plavix, Coumadin, Warfarin, Eliquis, or Xarelto. All other home medications may be resumed as scheduled.     DIET  Start with a bland diet and slowly advance to regular food as your appetite improves. Avoid spicy and greasy/fried foods until your appetite returns. Do not eat large meals. Eat small frequent meals.     Drink plenty of water or a sports drink to stay hydrated.    Do not drink alcohol (beer, wine, liquor) or use tobacco products.     You may notice loose stool or diarrhea if you eat greasy or fatty  foods.      WOUND CARE  The incisions are covered with skin glue. You can shower 24 hours after surgery and get the dressings wet.    The skin glue will stay on for 10 to 14 days after surgery.     Soap and water can get on the incisions but do not scrub the wounds. No hair dye or chemicals of any kind should get on the incisions.     Do not apply any topical ointments such as Neosporin or Hydrogen Peroxide.    Do not swim or submerge the incisions under water for 1 month.    ACTIVITY  Every day you should be up walking around the house or out doors. Do not lie in bed all day. Staying active prevents blood clots and pneumonia.    You can go up and down stairs carefully.      Do not lift more than 20 pounds or perform strenuous activity that requires straining the core muscles for 6 weeks.    You may ride in a car but should not drive the car for at least one week.     APPOINTMENT  Please call our office at [phone] soon to make an appointment.    For questions or concerns please call our office between 8:30 a.m. and 5 p.m. Monday through Friday. The number above directs to the answering service after hours to reach the on-call physician.    Please call our office immediately for fever greater than 100.5, excess bleeding, inability to urinate, severe abdominal pain, severe diarrhea, uncontrollable vomiting.      For life threatening emergencies such as severe chest pain, difficulty breathing, or loss of conciousness call 271.

## 2024-02-10 NOTE — PROGRESS NOTES
NURSING DISCHARGE NOTE    Discharged Home via Wheelchair.  Accompanied by Support staff  Belongings returned to patient.    Discharge instructions reviewed with pt and wife, all questions answered.  Pt left in stable condition.

## 2024-02-10 NOTE — OPERATIVE REPORT
[facility]  Operative Note    Eliazar Ingram Location: OR   [facility] [id number] MRN [id number]    1990 Age 33 year old   Admission Date 2024 Operation Date 2024   Attending Physician Robb Benito MD Operating Physician Robb Benito MD   PCP Michael Brunner        Preoperative Diagnosis: Appendicitis [K37]  Postoperative Diagnosis: Same as pre-op diagnosis.  Procedure: Laparoscopic appendectomy    Anesthesia: General    Anesthesiologist: Anesthesiologist.: Shanice Edwards MD  Primary Surgeon: Robb Benito MD  Assistant: Guilherme Tyson MS 3   Specimen: Appendix  Estimated Blood Loss: Blood Output: 10 mL (2024  5:28 PM)    Complications: None  Drains: None  Operative Findings: Acutely inflamed non perforated appendix  Indication for Surgery:  Eliazar Ingram is a 33 year old male who presents with 1 day of generalized abdominal pain that is now localized to the right lower quadrant. The patient is tender to palpation over McBurney's point. Diagnostic imaging is consistent with acute appendicitis without perforation or abscess.   Description of Procedure:   The patient was transported to the operating room and transferred to the OR table and placed in supine position. General endotracheal anesthesia was administered. A Mario catheter was placed. The abdomen was prepped and draped in sterile fashion. Pre-operative antibiotics were given. A time-out was performed.   A supraumbilical incision was made and the umbilical stalk grasped with a Kocher clamp and elevated. A Veress needle was inserted to establish pneumoperitoneum of 15mmHg.  A 5-mm trocar was placed through the incision and a laparoscope was inserted into the abdomen. Initial diagnostic survey revealed no injury secondary to our entry nor unexpected intraabdominal abnormality. Under direct visualization a 5-mm trocar was placed in the suprapubic area and a 12-mm trocar was placed in the left lower quadrant. Patient is  positioned in trendelenburg and right side up.  Attention was turned to the right lower quadrant. The intestines and omentum were gently manipulated to expose the appendix. The appendix was grasped, elevated and retracted. A combination of  blunt dissection and cautery were used to separate the appendix from its adhesions to the pelvic sidewall. The LigaSure device was used to divide the mesoappendix and clear the base of the appendix. An EndoGIA stapler with a blue cartridge was used to divide the base of the appendix. The appendix was placed into a retrival sac and removed from the left lower quadrant port site under direct visualization. The staple line and mesoappendix were inspected. There was no bleeding or drainage. Hemostasis was excellent. The right lower quadrant was judiciously irrigated until the effluent fluid was clear. The pelvis was also irrigated. All fluid was suctioned from the abdomen.   The 12-mm trocar was removed the incision was reapproximated using 1 PDS suture with an Endo Close device. The 5-mm trocar was removed under direct visualization. Pneumoperitoneum was aspirated and released. The 5-mm camera and trocar were removed from the umbilical position. All wounds were cleansed and irrigated and injected with local anesthetic. All skin incisions were reapproximated using  4-0 Monocryl suture. Skin glue was applied to all incisions.   The drapes were removed, the gamboa catheter was removed, and the patient was awakened from anesthesia and brought to the recovery room in good condition. The patient tolerated the procedure without apparent complication. Needle, sponge, and instrument counts were correct at the end of the procedure.     Robb Benito MD  2/9/2024  6:05 PM

## 2024-02-10 NOTE — ANESTHESIA POSTPROCEDURE EVALUATION
[facility]    Eliazar RICCI Raprospercong Patient Status:  Observation   Age/Gender 33 year old male MRN [id number]   Location [facility] POST ANESTHESIA CARE UNIT Attending Robb Benito MD   Hosp Day # 0 PCP Michael Brunner       Anesthesia Post-op Note    LAPAROSCOPIC APPENDECTOMY    Procedure Summary       Date: 02/09/24 Room / Location:  MAIN OR 10 / [facility] MAIN OR    Anesthesia Start: 1651 Anesthesia Stop: 1802    Procedure: LAPAROSCOPIC APPENDECTOMY (Abdomen) Diagnosis:       Appendicitis      (Appendicitis [K37])    Surgeons: Robb Benito MD Anesthesiologist: Shanice Edwards MD    Anesthesia Type: general ASA Status: 2            Anesthesia Type: general    Vitals Value Taken Time   /97 02/09/24 1802   Temp 98.5 02/09/24 1802   Pulse 60 02/09/24 1802   Resp 16 02/09/24 1802   SpO2 100 % 02/09/24 1801   Vitals shown include unfiled device data.    Patient Location: PACU    Anesthesia Type: general    Airway Patency: patent    Postop Pain Control: adequate    Mental Status: mildly sedated but able to meaningfully participate in the post-anesthesia evaluation    Nausea/Vomiting: none    Cardiopulmonary/Hydration status: stable euvolemic    Complications: no apparent anesthesia related complications    Postop vital signs: stable    Dental Exam: Unchanged from Preop    Patient to be discharged from PACU when criteria met.

## 2024-02-27 ENCOUNTER — OFFICE VISIT (OUTPATIENT)
Facility: LOCATION | Age: 34
End: 2024-02-27
Payer: COMMERCIAL

## 2024-02-27 VITALS — TEMPERATURE: 98 F | HEART RATE: 68 BPM

## 2024-02-27 DIAGNOSIS — Z90.49 STATUS POST LAPAROSCOPIC APPENDECTOMY: Primary | ICD-10-CM

## 2024-02-27 PROBLEM — K35.80 ACUTE APPENDICITIS, UNSPECIFIED ACUTE APPENDICITIS TYPE: Status: RESOLVED | Noted: 2024-02-08 | Resolved: 2024-02-27

## 2024-02-27 PROCEDURE — 99024 POSTOP FOLLOW-UP VISIT: CPT

## 2024-02-27 NOTE — PROGRESS NOTES
Post Operative Visit Note       Active Problems  1. Status post laparoscopic appendectomy         Chief Complaint   Chief Complaint   Patient presents with    Post-Op     PO- LAPAROSCOPIC APPENDECTOMY 2/9 KAMILLA - Pt denies any f/n/v           History of Present Illness   34 year old male POD# 18 from laparoscopic appendectomy presents for postop follow up.    The patient is doing well post operatively. He denies pain or discomfort. He is tolerating oral intake and having bowel movements. The patient has no concern for infection at his incisions. He denies fever or chills. He would like to discuss his restrictions.    Overall the patient is recovering appropriately.       Allergies  Eliazar has No Known Allergies.    Past Medical / Surgical / Social / Family History    The past medical and past surgical history have been reviewed by me today.     History reviewed. No pertinent past medical history.  Past Surgical History:   Procedure Laterality Date    LAPAROSCOPIC APPENDECTOMY  02/09/2024    UPPER GI ENDOSCOPY,EXAM         The family history and social history have been reviewed by me today.    History reviewed. No pertinent family history.  Social History     Socioeconomic History    Marital status:    Tobacco Use    Smoking status: Never    Smokeless tobacco: Never   Vaping Use    Vaping Use: Never used   Substance and Sexual Activity    Alcohol use: Yes     Comment: socially    Drug use: Never        Current Outpatient Medications:     oxyCODONE 5 MG Oral Tab, Take 1 tablet (5 mg total) by mouth every 4 (four) hours as needed for Pain., Disp: 15 tablet, Rfl: 0    Naloxone HCl 4 MG/0.1ML Nasal Liquid, 4 mg by Nasal route as needed. If patient remains unresponsive, repeat dose in other nostril 2-5 minutes after first dose., Disp: 1 kit, Rfl: 0    Amphetamine-Dextroamphet ER (ADDERALL XR) 20 MG Oral Capsule SR 24 Hr, Take 1 capsule (20 mg total) by mouth every morning., Disp: , Rfl:       Review of Systems  A  10 point Review of Systems has been completed by me today and is negative except as above in the HPI.    Physical Findings   Pulse 68   Temp 97.8 °F (36.6 °C)   Gen/psych: alert and oriented, cooperative, no apparent distress  Cardiovascular: regular rate  Respiratory: respirations unlabored, no wheeze  Abdominal: soft, non-tender, non-distended, no guarding/rebound  Incisions: clean, dry, and intact. No bleeding or surrounding erythema         Assessment/Plan  1. Status post laparoscopic appendectomy        34 year old male POD# 18 from laparoscopic appendectomy    Overall the patient is doing well today.     Continue p.o. Tylenol or Motrin for pain as needed    The patient may apply soap and water directly to the incisions. Okay to submerge in water    Do not lift anything greater than 20 pounds until 4-6 weeks after surgery.    Pathology discussed with the patient- acute appendicitis.     The patient is doing well and does not require further follow up appointments. If there are any questions or concerns, please call the office or make an appointment as needed.    The patient understands and agrees to the current plan. All questions and concerns were addressed during today's encounter.           No orders of the defined types were placed in this encounter.       Imaging & Referrals   None    Follow Up  Return if symptoms worsen or fail to improve.    Km Lovelace PA-C  [facility]  2/27/2024  4:50 PM

## (undated) DEVICE — TRAY CATH 16FR F INCL BARDX IC COMPLT CARE

## (undated) DEVICE — STAPLER MED SHFT L340MM JAW L45MM STD ECHELON

## (undated) DEVICE — SLEEVE TRCR L100MM DIA5MM ABD ADV FIX KII

## (undated) DEVICE — LIGHT HANDLE

## (undated) DEVICE — SUTURE PDS II 1 CT-1

## (undated) DEVICE — SRG GLV PROTEXIS BLUE 7.5

## (undated) DEVICE — ENSEAL SEALER TISS L37 CM CURV

## (undated) DEVICE — ADHESIVE SKIN TOP FOR WND CLSR DERMBND ADV

## (undated) DEVICE — SOLUTION IRRIG 1000ML 0.9% NACL USP BTL

## (undated) DEVICE — RELOAD STPLR 45X3.6MM 6 ROW BLU ENDOSCP LIN

## (undated) DEVICE — SUTURE PASSOR WITH GUIDE

## (undated) DEVICE — DISSECTOR MIRCO TIP MARYLAND

## (undated) DEVICE — GRASPER LAP L35MM DIA5MM EPIX

## (undated) DEVICE — SYSTEM ACCS L100MM DIA12MM ABD 1ST ENTRY Z

## (undated) DEVICE — SYSTEM OPT ACCS L100MM TIP 5MM 1ST ENTRY KII

## (undated) DEVICE — SLEEVE COMPR M KNEE LEN SGL USE KENDALL SCD

## (undated) DEVICE — APPLICATOR SKIN PREP 26ML HI LT ORNG 2% CHG

## (undated) DEVICE — APPLIER CLP MD/LG PRT 5MM TI G UNIV EPIX

## (undated) DEVICE — SUTURE MCRYL SZ 4-0 L18IN ABSRB UD L19MM PS-2

## (undated) DEVICE — SYSTEM POS W20XH1XL29IN PT PIGAZZI

## (undated) DEVICE — PACK PBDS LAP CHOLE MODULE

## (undated) DEVICE — GLOVE SUR 7 PROTEXIS PI PIP CRM PWD F

## (undated) DEVICE — BLADE #15 STRL STAINLESS

## (undated) DEVICE — POUCH SPECIMEN WIRE 6X3 250ML

## (undated) DEVICE — TIP GRSP DISP HNTR RENEW

## (undated) DEVICE — NEEDLE VERESS 120MM

## (undated) NOTE — LETTER
[address]  [address]  Authorization for Surgical Operation and Procedure     Date:___________                                                                                                         Time:__________  I hereby authorize Surgeon(s):  Robb Benito MD, my physician and his/her assistants (if applicable), which may include medical students, residents, and/or fellows, to perform the following surgical operation/ procedure and administer such anesthesia as may be determined necessary by my physician:  Operation/Procedure name (s) Procedure(s):  LAPAROSCOPIC APPENDECTOMY on Eliazar Ingram   2.   I recognize that during the surgical operation/procedure, unforeseen conditions may necessitate additional or different procedures than those listed above.  I, therefore, further authorize and request that the above-named surgeon, assistants, or designees perform such procedures as are, in their judgment, necessary and desirable.    3.   My surgeon/physician has discussed prior to my surgery the potential benefits, risks and side effects of this procedure; the likelihood of achieving goals; and potential problems that might occur during recuperation.  They also discussed reasonable alternatives to the procedure, including risks, benefits, and side effects related to the alternatives and risks related to not receiving this procedure.  I have had all my questions answered and I acknowledge that no guarantee has been made as to the result that may be obtained.    4.   Should the need arise during my operation/procedure, which includes change of level of care prior to discharge, I also consent to the administration of blood and/or blood products.  Further, I understand that despite careful testing and screening of blood or blood products by collecting agencies, I may still be subject to ill effects as a result of receiving a blood transfusion and/or blood products.  The  following are some, but not all, of the potential risks that can occur: fever and allergic reactions, hemolytic reactions, transmission of diseases such as Hepatitis, AIDS and Cytomegalovirus (CMV) and fluid overload.  In the event that I wish to have an autologous transfusion of my own blood, or a directed donor transfusion, I will discuss this with my physician.  Check only if Refusing Blood or Blood Products  I understand refusal of blood or blood products as deemed necessary by my physician may have serious consequences to my condition to include possible death. I hereby assume responsibility for my refusal and release the hospital, its personnel, and my physicians from any responsibility for the consequences of my refusal.          o  Refuse      5.   I authorize the use of any specimen, organs, tissues, body parts or foreign objects that may be removed from my body during the operation/procedure for diagnosis, research or teaching purposes and their subsequent disposal by hospital authorities.  I also authorize the release of specimen test results and/or written reports to my treating physician on the hospital medical staff or other referring or consulting physicians involved in my care, at the discretion of the Pathologist or my treating physician.    6.   I consent to the photographing or videotaping of the operations or procedures to be performed, including appropriate portions of my body for medical, scientific, or educational purposes, provided my identity is not revealed by the pictures or by descriptive texts accompanying them.  If the procedure has been photographed/videotaped, the surgeon will obtain the original picture, image, videotape or CD.  The hospital will not be responsible for storage, release or maintenance of the picture, image, tape or CD.    7.   I consent to the presence of a  or observers in the operating room as deemed necessary by my physician or their designees.     8.   I recognize that in the event my procedure results in extended X-Ray/fluoroscopy time, I may develop a skin reaction.    9. If I have a Do Not Attempt Resuscitation (DNAR) order in place, that status will be suspended while in the operating room, procedural suite, and during the recovery period unless otherwise explicitly stated by me (or a person authorized to consent on my behalf). The surgeon or my attending physician will determine when the applicable recovery period ends for purposes of reinstating the DNAR order.  10. Patients having a sterilization procedure: I understand that if the procedure is successful the results will be permanent and it will therefore be impossible for me to inseminate, conceive, or bear children.  I also understand that the procedure is intended to result in sterility, although the result has not been guaranteed.   11. I acknowledge that my physician has explained sedation/analgesia administration to me including the risk and benefits I consent to the administration of sedation/analgesia as may be necessary or desirable in the judgment of my physician.    I CERTIFY THAT I HAVE READ AND FULLY UNDERSTAND THE ABOVE CONSENT TO OPERATION and/or OTHER PROCEDURE.    _________________________________________  __________________________________  Signature of Patient     Signature of Responsible Person         ___________________________________         Printed Name of Responsible Person           _________________________________                 Relationship to Patient  _________________________________________  ______________________________  Signature of Witness          Date  Time      Patient Name: Eliazar RICCI Nataly     : 1990                 Printed: 2024     Medical Record #: [id number]                     Page 1 of [address] [place] [address] [place]  [address]    Consent for Anesthesia    I, Eliazar Ingram  agree to be cared for by an anesthesiologist, who is specially trained to monitor me and give me medicine to put me to sleep or keep me comfortable during my procedure    I understand that my anesthesiologist is not an employee or agent of [facility] or [employer]. He or she works for [employer].    As the patient asking for anesthesia services, I agree to:  Allow the anesthesiologist (anesthesia doctor) to give me medicine and do additional procedures as necessary. Some examples are: Starting or using an “IV” to give me medicine, fluids or blood during my procedure, and having a breathing tube placed to help me breathe when I’m asleep (intubation). In the event that my heart stops working properly, I understand that my anesthesiologist will make every effort to sustain my life, unless otherwise directed by [facility] Do Not Resuscitate documents.  Tell my anesthesia doctor before my procedure:  If I am pregnant.  The last time that I ate or drank.  All of the medicines I take (including prescriptions, herbal supplements, and pills I can buy without a prescription (including street drugs/illegal medications). Failure to inform my anesthesiologist about these medicines may increase my risk of anesthetic complications.  If I am allergic to anything or have had a reaction to anesthesia before.  I understand how the anesthesia medicine will help me (benefits).  I understand that with any type of anesthesia medicine there are risks:  The most common risks are: nausea, vomiting, sore throat, muscle soreness, damage to my eyes, mouth, or teeth (from breathing tube placement).  Rare risks include: remembering what happened during my procedure, allergic reactions to medications, injury to my airway, heart, lungs, vision, nerves, or muscles and in extremely rare instances death.  My doctor has explained to me other choices available to me for my care (alternatives).  Pregnant Patients  (“epidural”):  I understand that the risks of having an epidural (medicine given into my back to help control pain during labor), include itching, low blood pressure, difficulty urinating, headache or slowing of the baby’s heart. Very rare risks include infection, bleeding, seizure, irregular heart rhythms and nerve injury.  Regional Anesthesia (“spinal”, “epidural”, & “nerve blocks”):  I understand that rare but potential complications include headache, bleeding, infection, seizure, irregular heart rhythms, and nerve injury.    I can change my mind about having anesthesia services at any time before I get the medicine.    _____________________________________________________________________________  Patient (or Representative) Signature/Relationship to Patient  Date   Time    _____________________________________________________________________________   Name (if used)    Language/Organization   Time    _____________________________________________________________________________  Anesthesiologist Signature     Date   Time  I have discussed the procedure and information above with the patient (or patient’s representative) and answered their questions. The patient or their representative has agreed to have anesthesia services.    _____________________________________________________________________________  Witness        Date   Time  I have verified that the signature is that of the patient or patient’s representative, and that it was signed before the procedure  Patient Name: Eliazar RICCI Nataly     : 1990                 Printed: 2024     Medical Record #: [id number]                     Page 2 of 2

## (undated) NOTE — LETTER
[address], [place]  Authorization for Invasive Procedures  1.  I hereby authorize Dr. Radha Lee , my physician and whomever may be designated as the doctor's assistant, to perform the following operation and/or procedure:  Lumbar pu performed for the purposes of advancing medicine, science, and/or education, provided my identity is not revealed. If the procedure has been videotaped, the physician/surgeon will obtain the original videotape.  The hospital will not be responsible for stor My signature below affirms that prior to the time of the procedure, I have explained to the patient and/or his legal representative, the risks and benefits involved in the proposed treatment and any reasonable alternative to the proposed treatment.  I have